# Patient Record
Sex: FEMALE | HISPANIC OR LATINO
[De-identification: names, ages, dates, MRNs, and addresses within clinical notes are randomized per-mention and may not be internally consistent; named-entity substitution may affect disease eponyms.]

---

## 2021-08-18 PROBLEM — Z00.00 ENCOUNTER FOR PREVENTIVE HEALTH EXAMINATION: Status: ACTIVE | Noted: 2021-08-18

## 2021-08-19 ENCOUNTER — APPOINTMENT (OUTPATIENT)
Dept: OBGYN | Facility: CLINIC | Age: 66
End: 2021-08-19
Payer: MEDICARE

## 2021-08-19 ENCOUNTER — NON-APPOINTMENT (OUTPATIENT)
Age: 66
End: 2021-08-19

## 2021-08-19 VITALS — SYSTOLIC BLOOD PRESSURE: 168 MMHG | HEART RATE: 64 BPM | DIASTOLIC BLOOD PRESSURE: 91 MMHG

## 2021-08-19 VITALS — BODY MASS INDEX: 23.84 KG/M2 | HEIGHT: 56 IN | WEIGHT: 106 LBS

## 2021-08-19 DIAGNOSIS — R10.2 PELVIC AND PERINEAL PAIN: ICD-10-CM

## 2021-08-19 PROCEDURE — 76830 TRANSVAGINAL US NON-OB: CPT

## 2021-08-19 PROCEDURE — 99213 OFFICE O/P EST LOW 20 MIN: CPT | Mod: 25

## 2021-08-19 NOTE — HISTORY OF PRESENT ILLNESS
[Localized] : localized [FreeTextEntry1] : left sided pain ..nagging , on and off...more off than on.\par \par last visit \par \par \par    	Print\par Patient\par Name	DMITRIY MANCIA (66yo, F) ID# [id number]	Appt. Date/Time	2020 09:20AM\par 	1955	Service Dept.	[facility]\par Provider	CLAUDIA FORBES MD\par Insurance	\par Med Primary: MEDICARE-[facility] (MEDICARE)\par Insurance # : [id number]\par Employer Name : [employer]\par Med Secondary: [employer] (MEDICARE SUPPLEMENT)\par Insurance # : [id number]\par Policy/Group # : [id number]\par Employer Name : [employer]\par Prescription: [facility] - Member is eligible. details\par Chief Complaint\par gyn exam\par \par rash and dryness on labia\par Patient's Care Team\par Primary Care Provider: ASHLEY HOGAN MD: [address] [place] [address], Ph [phone], Fax [phone] NPI: [id number]\par Patient's Pharmacies\par [id number] [address] [place] [address], Ph [phone], Fax [phone]\par Vitals\par 2020 09:56 am\par Ht:	4 ft 9 in\par Wt:	106 lbs\par BMI:	22.9\par BP:	130/72 sitting\par Allergies\par Reviewed Allergies\par TETRACYCLINE	\par Medications\par Reviewed Medications\par acyclovir 5 % topical ointment\par 18   filled	[facility]\par amLODIPine 5 mg tablet\par 10/21/20   filled	[facility]\par amoxicillin 500 mg-potassium clavulanate 125 mg tablet\par 17   filled	[facility]\par atorvastatin 20 mg tablet\par 19   filled	[facility]\par azithromycin 250 mg tablet\par 19   filled	[facility]\par benzonatate 100 mg capsule\par 10/21/17   filled	[facility]\par ciprofloxacin 500 mg tablet\par 12/01/15   filled	[facility]\par clotrimazole-betamethasone 1 %-0.05 % topical cream\par Apply 2 g twice a day by topical route as directed for 7 days.\par 11/11/15   filled	[facility]\par fluocinonide 0.05 % topical ointment\par PLEASE SEE ATTACHED FOR DETAILED DIRECTIONS\par 20   filled	[facility]\par mometasone 0.1 % topical cream\par 18   filled	[facility]\par omeprazole 20 mg capsule,delayed release\par TAKE 1 CAPSULE BY MOUTH ONCE DAILY 30 MINUTES TO 1 HOUR BEFORE A MEAL\par 20   filled	[facility]\par Plenvu 140 gram-9 gram-5.2 gram powder packs\par 19   filled	[facility]\par Synthroid 25 mcg tablet\par 10/19/20   filled	[facility]\par Synthroid 50 mcg tablet\par 17   filled	[facility]\par valACYclovir 1 gram tablet\par Take 1 tablet(s) every 12 hours by oral route for 10 days.\par 18   filled	[facility]\par valsartan 320 mg tablet\par 10/21/20   filled	[facility]\par Problems\par Reviewed Problems\par Uterine leiomyoma\par Blood in urine\par Vulvitis\par Female genital organ symptoms\par Family History\par Reviewed Family History\par Mother	- Diabetes mellitus\par -  ?67\par Father	- Malignant tumor of pancreas\par -  in 40s\par Social History\par Reviewed Social History\par 2019 novel coronavirus (-nCoV)\par Has patient visited an area known to be high risk for 2019 n-CoV?: N\par In the 14 days before symptom onset, did the patient spend time in [facility]?: N\par Tobacco Smoking Status: Never smoker\par Non-smoker\par Smokeless Tobacco Status: Never used smokeless tobacco\par Tobacco-years of use: 0\par E-cigarette/Vape Status: Never used electronic cigarettes\par Most Recent Tobacco Use Screenin2020\par Surgical History\par Reviewed Surgical History\par tubal ligation - ?laparoscopic\par GYN History\par Reviewed GYN History\par LMP: Definite.\par If Post Menopausal, Age at Menopause: 46.\par Date of LMP: (Notes: 46 y/old).\par Obstetric History\par Reviewed Obstetric History\par TOTAL	FULL	PRE	AB. I	AB. S	ECTOPICS	MULTIPLE	LIVING\par 4	2		1	1			2\par Past Medical History\par Past Medical History not reviewed (last reviewed 2015)\par Heart Conditions: Y - mitral valve prolapse\par Thyroid Problems: Y - hypothyroid on synthroid\par Screening\par None recorded.\par HPI\par pt c/o "rash" on labia, feels bumps, "rice grain"\par \par ROS\par Patient reports no fever, no fatigue, and no significant weight loss/gain. She reports no chest pain, no palpitations, no SOB, no orthopnea, and no edema. She reports no heartburn, no indigestion, no difficulty swallowing, no nausea, no vomiting, no abdominal pain, and no bowel movement changes. She reports no hematuria, no abnormal bleeding, no flank pain, no trouble urinating, no incontinence, no rash, no lesion, no discharge, no vaginal odor, and no vaginal itching. She reports no endocrine symptoms. She reports no PMDD symptoms. She reports no menopausal symptoms. She reports no sexual problems. She reports no depression and no alcoholism.\par ROS as noted in the HPI\par Physical Exam\par Patient is a 65-year-old female.\par \par Chaperone: Chaperone: present.\par \par Female Genitalia: Vulva: no masses, atrophy, or lesions; tiny epidermoid cysts left labia majora <0.5cm (2-3)\par no rash or evidence of infection. Bladder/Urethra: no urethral discharge or mass and normal meatus and bladder non distended. Vagina no tenderness, erythema, cystocele, rectocele, abnormal vaginal discharge, or vesicle(s) or ulcers. Cervix: no discharge or cervical motion tenderness and grossly normal. Uterus: normal size and shape and midline, mobile, non-tender, and no uterine prolapse. Adnexa/Parametria: no parametrial tenderness or mass and no adnexal tenderness or ovarian mass.\par Assessment / Plan\par 1. Cyst of vulva -\par inclusion cyst\par \par N90.7: Vulvar cyst\par \par 2. Screening for osteoporosis\par Z13.820: Encounter for screening for osteoporosis\par BONE DENSITY\par \par Discussion Notes\par discussed management of these types of skin cysts, assured pt that conservative management is recommended treatment plan unless growth of cyst occurs and pt is uncomfortable.\par \par colonoscopy due in \par \par \par Return to Office\par None recorded.\par Encounter Sign-Off\par Encounter signed-off by Claudia Forbes MD, 2020.\par Encounter performed and documented by Claudia Forbes MD\par Encounter reviewed & signed by Claudia Forbes MD on 2020 at 10:14am [[place]] : not intercourse [Urination] : not urination [Bowel Movement] : not bowel movement [TextBox_7] : llq

## 2021-08-19 NOTE — PROCEDURE
[Pelvic Pain] : pelvic pain [Transvaginal Ultrasound] : transvaginal ultrasound [FreeTextEntry3] : fibroid unchanged\par no free fluid\par cervix normal\par lining thin [FreeTextEntry5] : 50 cc volume, including the 28cc  myoma, endometrium 2 mm, thin...no free fluid [FreeTextEntry7] : 0.5cc [FreeTextEntry8] : 0.8cc

## 2021-08-19 NOTE — PHYSICAL EXAM
[Examination Of The Breasts] : a normal appearance [No Masses] : no breast masses were palpable [Labia Majora] : normal [Labia Minora] : normal [Normal] : normal [Enlarged ___ wks] : enlarged [unfilled] ~Uweeks [Uterine Adnexae] : normal [Adnexa Tenderness On The Left] : tender [___] : [unfilled] cm mass on the left

## 2021-09-07 ENCOUNTER — NON-APPOINTMENT (OUTPATIENT)
Age: 66
End: 2021-09-07

## 2021-11-12 ENCOUNTER — NON-APPOINTMENT (OUTPATIENT)
Age: 66
End: 2021-11-12

## 2021-11-17 ENCOUNTER — NON-APPOINTMENT (OUTPATIENT)
Age: 66
End: 2021-11-17

## 2021-11-30 ENCOUNTER — NON-APPOINTMENT (OUTPATIENT)
Age: 66
End: 2021-11-30

## 2022-01-25 ENCOUNTER — NON-APPOINTMENT (OUTPATIENT)
Age: 67
End: 2022-01-25

## 2022-02-15 ENCOUNTER — APPOINTMENT (OUTPATIENT)
Dept: OBGYN | Facility: CLINIC | Age: 67
End: 2022-02-15
Payer: MEDICARE

## 2022-02-15 VITALS
HEIGHT: 56 IN | SYSTOLIC BLOOD PRESSURE: 164 MMHG | HEART RATE: 63 BPM | WEIGHT: 106 LBS | DIASTOLIC BLOOD PRESSURE: 98 MMHG | BODY MASS INDEX: 23.84 KG/M2

## 2022-02-15 DIAGNOSIS — Z86.39 PERSONAL HISTORY OF OTHER ENDOCRINE, NUTRITIONAL AND METABOLIC DISEASE: ICD-10-CM

## 2022-02-15 DIAGNOSIS — Z80.0 FAMILY HISTORY OF MALIGNANT NEOPLASM OF DIGESTIVE ORGANS: ICD-10-CM

## 2022-02-15 DIAGNOSIS — N76.0 ACUTE VAGINITIS: ICD-10-CM

## 2022-02-15 DIAGNOSIS — Z86.79 PERSONAL HISTORY OF OTHER DISEASES OF THE CIRCULATORY SYSTEM: ICD-10-CM

## 2022-02-15 LAB
BILIRUB UR QL STRIP: NORMAL
CLARITY UR: CLEAR
COLLECTION METHOD: NORMAL
GLUCOSE UR-MCNC: NORMAL
HCG UR QL: 0.2 EU/DL
HGB UR QL STRIP.AUTO: NORMAL
KETONES UR-MCNC: NORMAL
LEUKOCYTE ESTERASE UR QL STRIP: NORMAL
NITRITE UR QL STRIP: NORMAL
PH UR STRIP: 6
PROT UR STRIP-MCNC: NORMAL
SP GR UR STRIP: 1.02

## 2022-02-15 PROCEDURE — 99213 OFFICE O/P EST LOW 20 MIN: CPT

## 2022-02-15 PROCEDURE — 81003 URINALYSIS AUTO W/O SCOPE: CPT | Mod: QW

## 2022-02-15 RX ORDER — CLOTRIMAZOLE AND BETAMETHASONE DIPROPIONATE 10; .5 MG/G; MG/G
1-0.05 CREAM TOPICAL TWICE DAILY
Qty: 1 | Refills: 1 | Status: ACTIVE | COMMUNITY
Start: 2022-02-15 | End: 1900-01-01

## 2022-02-15 RX ORDER — LEVOTHYROXINE SODIUM 25 UG/1
25 TABLET ORAL
Refills: 0 | Status: ACTIVE | COMMUNITY

## 2022-02-15 NOTE — HISTORY OF PRESENT ILLNESS
[FreeTextEntry1] : 66-year-old G4, P2 here for follow-up GYN visit.  She is experiencing vulvovaginal irritation for the past week with burning and itching, thinks she might have an infection.  She tried over-the-counter medications however her symptoms persisted.

## 2022-02-15 NOTE — PLAN
[FreeTextEntry1] : We will follow up urinary and vaginitis cultures and treat accordingly.  Will start topical ointment for vulvovaginal external irritation.\par Perineal hygiene practices reviewed, advised to avoid products with containing additives/scents or antibacterial components, and the use of cotton under garments and loose fitting clothing.\par

## 2022-02-15 NOTE — PHYSICAL EXAM
[Chaperone Present] : A chaperone was present in the examining room during all aspects of the physical examination [Appropriately responsive] : appropriately responsive [Alert] : alert [No Acute Distress] : no acute distress [Oriented x3] : oriented x3 [Labia Majora] : normal [Labia Minora] : normal [Normal] : normal [Uterine Adnexae] : normal [FreeTextEntry5] : nonlabored  [Vulvar Atrophy] : vulvar atrophy [Atrophy] : atrophy

## 2022-02-18 ENCOUNTER — NON-APPOINTMENT (OUTPATIENT)
Age: 67
End: 2022-02-18

## 2022-02-18 LAB
A VAGINAE DNA VAG QL NAA+PROBE: NORMAL
BACTERIA UR CULT: NORMAL
BVAB2 DNA VAG QL NAA+PROBE: NORMAL
C KRUSEI DNA VAG QL NAA+PROBE: NEGATIVE
C TRACH RRNA SPEC QL NAA+PROBE: NEGATIVE
MEGA1 DNA VAG QL NAA+PROBE: NORMAL
N GONORRHOEA RRNA SPEC QL NAA+PROBE: NEGATIVE
T VAGINALIS RRNA SPEC QL NAA+PROBE: NEGATIVE

## 2022-02-22 ENCOUNTER — NON-APPOINTMENT (OUTPATIENT)
Age: 67
End: 2022-02-22

## 2022-06-27 ENCOUNTER — APPOINTMENT (OUTPATIENT)
Dept: OBGYN | Facility: CLINIC | Age: 67
End: 2022-06-27

## 2022-06-27 VITALS
BODY MASS INDEX: 23.62 KG/M2 | HEIGHT: 56 IN | DIASTOLIC BLOOD PRESSURE: 83 MMHG | HEART RATE: 64 BPM | WEIGHT: 105 LBS | TEMPERATURE: 98 F | SYSTOLIC BLOOD PRESSURE: 138 MMHG

## 2022-06-27 DIAGNOSIS — N90.7 VULVAR CYST: ICD-10-CM

## 2022-06-27 PROCEDURE — 99212 OFFICE O/P EST SF 10 MIN: CPT

## 2022-06-27 NOTE — PHYSICAL EXAM
[Chaperone Present] : A chaperone was present in the examining room during all aspects of the physical examination [Appropriately responsive] : appropriately responsive [Alert] : alert [No Acute Distress] : no acute distress [Vulvar Atrophy] : vulvar atrophy [Labia Majora] : normal [Labia Minora] : normal [Normal] : normal [Uterine Adnexae] : normal [FreeTextEntry1] : epidermoid cyst, non tender 0.5cm  mons

## 2022-12-20 ENCOUNTER — NON-APPOINTMENT (OUTPATIENT)
Age: 67
End: 2022-12-20

## 2022-12-20 ENCOUNTER — APPOINTMENT (OUTPATIENT)
Dept: OBGYN | Facility: CLINIC | Age: 67
End: 2022-12-20

## 2022-12-20 VITALS
HEART RATE: 67 BPM | HEIGHT: 56 IN | DIASTOLIC BLOOD PRESSURE: 86 MMHG | BODY MASS INDEX: 23.62 KG/M2 | WEIGHT: 105 LBS | TEMPERATURE: 98 F | SYSTOLIC BLOOD PRESSURE: 149 MMHG

## 2022-12-20 DIAGNOSIS — Z01.419 ENCOUNTER FOR GYNECOLOGICAL EXAMINATION (GENERAL) (ROUTINE) W/OUT ABNORMAL FINDINGS: ICD-10-CM

## 2022-12-20 DIAGNOSIS — N85.2 HYPERTROPHY OF UTERUS: ICD-10-CM

## 2022-12-20 PROCEDURE — G0101: CPT

## 2022-12-20 NOTE — PHYSICAL EXAM
[Examination Of The Breasts] : a normal appearance [No Masses] : no breast masses were palpable [No Lesions] : no lesions  [Labia Majora] : normal [Labia Minora] : normal [Normal] : normal [Uterine Adnexae] : normal [FreeTextEntry1] : NO ERYTHEMA

## 2022-12-22 LAB
CYTOLOGY CVX/VAG DOC THIN PREP: ABNORMAL
HPV HIGH+LOW RISK DNA PNL CVX: NOT DETECTED

## 2023-04-06 ENCOUNTER — APPOINTMENT (OUTPATIENT)
Dept: OBGYN | Facility: CLINIC | Age: 68
End: 2023-04-06

## 2024-12-23 DIAGNOSIS — Z12.39 ENCOUNTER FOR OTHER SCREENING FOR MALIGNANT NEOPLASM OF BREAST: ICD-10-CM

## 2025-01-02 ENCOUNTER — APPOINTMENT (OUTPATIENT)
Dept: OBGYN | Facility: CLINIC | Age: 70
End: 2025-01-02
Payer: MEDICARE

## 2025-01-02 VITALS
HEART RATE: 94 BPM | WEIGHT: 111 LBS | DIASTOLIC BLOOD PRESSURE: 76 MMHG | SYSTOLIC BLOOD PRESSURE: 152 MMHG | HEIGHT: 56 IN | BODY MASS INDEX: 24.97 KG/M2

## 2025-01-02 DIAGNOSIS — N85.2 HYPERTROPHY OF UTERUS: ICD-10-CM

## 2025-01-02 DIAGNOSIS — R39.89 OTHER SYMPTOMS AND SIGNS INVOLVING THE GENITOURINARY SYSTEM: ICD-10-CM

## 2025-01-02 DIAGNOSIS — Z78.9 OTHER SPECIFIED HEALTH STATUS: ICD-10-CM

## 2025-01-02 DIAGNOSIS — Z01.419 ENCOUNTER FOR GYNECOLOGICAL EXAMINATION (GENERAL) (ROUTINE) W/OUT ABNORMAL FINDINGS: ICD-10-CM

## 2025-01-02 LAB
BILIRUB UR QL STRIP: NORMAL
CLARITY UR: CLEAR
COLLECTION METHOD: NORMAL
GLUCOSE UR-MCNC: NORMAL
HCG UR QL: 0.2 EU/DL
HGB UR QL STRIP.AUTO: NORMAL
KETONES UR-MCNC: NORMAL
LEUKOCYTE ESTERASE UR QL STRIP: ABNORMAL
NITRITE UR QL STRIP: NORMAL
PH UR STRIP: 7
PROT UR STRIP-MCNC: NORMAL
SP GR UR STRIP: 1.02

## 2025-01-02 PROCEDURE — G0101: CPT

## 2025-01-02 PROCEDURE — 81003 URINALYSIS AUTO W/O SCOPE: CPT | Mod: QW

## 2025-01-02 RX ORDER — ATORVASTATIN CALCIUM 80 MG/1
TABLET, FILM COATED ORAL
Refills: 0 | Status: ACTIVE | COMMUNITY

## 2025-01-02 RX ORDER — LOSARTAN POTASSIUM 100 MG/1
TABLET, FILM COATED ORAL
Refills: 0 | Status: ACTIVE | COMMUNITY

## 2025-01-06 LAB
BACTERIA UR CULT: NORMAL
HPV HIGH+LOW RISK DNA PNL CVX: NOT DETECTED

## 2025-01-07 LAB — CYTOLOGY CVX/VAG DOC THIN PREP: ABNORMAL

## 2025-01-24 DIAGNOSIS — R92.333 MAMMOGRAPHIC HETEROGENEOUS DENSITY, BILATERAL BREASTS: ICD-10-CM

## 2025-01-25 PROBLEM — R92.333 HETEROGENEOUSLY DENSE TISSUE OF BOTH BREASTS ON MAMMOGRAPHY: Status: ACTIVE | Noted: 2025-01-25

## 2025-01-30 ENCOUNTER — NON-APPOINTMENT (OUTPATIENT)
Age: 70
End: 2025-01-30